# Patient Record
Sex: FEMALE | Race: WHITE | NOT HISPANIC OR LATINO | Employment: OTHER | URBAN - METROPOLITAN AREA
[De-identification: names, ages, dates, MRNs, and addresses within clinical notes are randomized per-mention and may not be internally consistent; named-entity substitution may affect disease eponyms.]

---

## 2023-01-22 ENCOUNTER — OFFICE VISIT (OUTPATIENT)
Dept: URGENT CARE | Facility: URGENT CARE | Age: 58
End: 2023-01-22
Payer: COMMERCIAL

## 2023-01-22 VITALS
OXYGEN SATURATION: 99 % | SYSTOLIC BLOOD PRESSURE: 101 MMHG | RESPIRATION RATE: 14 BRPM | BODY MASS INDEX: 24.13 KG/M2 | WEIGHT: 145 LBS | HEART RATE: 78 BPM | TEMPERATURE: 97.7 F | DIASTOLIC BLOOD PRESSURE: 70 MMHG

## 2023-01-22 DIAGNOSIS — H10.9 BACTERIAL CONJUNCTIVITIS: Primary | ICD-10-CM

## 2023-01-22 PROCEDURE — 99203 OFFICE O/P NEW LOW 30 MIN: CPT

## 2023-01-22 RX ORDER — BUPROPION HYDROCHLORIDE 150 MG/1
1 TABLET ORAL DAILY
COMMUNITY
Start: 2022-08-18

## 2023-01-22 RX ORDER — BUPROPION HYDROCHLORIDE 300 MG/1
TABLET ORAL
COMMUNITY
Start: 2022-08-18

## 2023-01-22 RX ORDER — DEXTROAMPHETAMINE SACCHARATE, AMPHETAMINE ASPARTATE, DEXTROAMPHETAMINE SULFATE AND AMPHETAMINE SULFATE 5; 5; 5; 5 MG/1; MG/1; MG/1; MG/1
TABLET ORAL
COMMUNITY
Start: 2022-10-26

## 2023-01-22 RX ORDER — ERYTHROMYCIN 5 MG/G
0.5 OINTMENT OPHTHALMIC AT BEDTIME
Qty: 3.5 G | Refills: 0 | Status: SHIPPED | OUTPATIENT
Start: 2023-01-22

## 2023-01-22 NOTE — PROGRESS NOTES
URGENT CARE  Assessment & Plan   Assessment:   Irene Stewart is a 57 year old female who's clinical presentation today is consistent with:   1. Bacterial conjunctivitis  - erythromycin (ROMYCIN) 5 MG/GM ophthalmic ointment; Place 0.5 inches into both eyes At Bedtime  Dispense: 3.5 g; Refill: 0    No alarm signs or symptoms present   Differential Diagnoses for this patient's CC include    Conjunctivitis: bacterial vs viral vs allergic, foreign body,    Dry eye, blepharitis, corneal abrasion, corneal ulcer, keratitis,   Contact lens abuse, uvitis/iritis, acute angle glaucoma, stye     Plan:  Will treat patient's bacterial conjunctivitis with topical antibiotic therapy at this time. Discussed w/ patient that ointment is preferred as it functions as a lubricant and aids in healing, however it can cause a transient blurring of vision and this is to be expected.   Encouraged patient for pain they should use Ibuprofen and/or tylenol as needed. Additionally a cool, moist compresses over the affected eye, as needed may also help relieve the discomfort  Educated patient  on the infective/ contagious nature of this condition. Informed parent that child can return to work in 24 hrs after antibiotic treatment. Encouraged strict hand washing, avoiding touching the eye, not sharing towels or bedding to prevent spread of infection.  Additionally we discussed if symptoms do not improve after starting today's treatment (or if symptoms worsen) to follow up in 3-5 days.    Patient  is  agreeable to treatment plan and state they will follow-up if symptoms do not improve and/or if symptoms worsen (see patient's AVS 'monitor for' section for specific patient instructions given and discussed regarding what to watch for and when to follow up)    Medications ordered are listed above, please see AVS for patient's specific and personalized discharge instructions given     CATA Ward [facility]  [facility]      ______________________________________________________________________        Subjective  Subjective     HPI: Irene Stewart  is a 57 year old  female who presents today for evaluation the following concerns:   Patient presents for evaluation of right eye that they describe as having increased   redness, having purulent drainage, mattering of eyelashes, and increased tearing  Patient denies any swelling} and/or foreign body sensation}  Patient states symptoms started about  1 day ago on 1/21/23   Onset of symptoms reported as sudden and not happening after any incident or  Trauma.   Patient denies wearing contacts. Patient denies any other viral URI symptoms.   Patient denies any Increasing eye pain or worsening/changes in vision/ visual acuity/ blurry vision   patient denies any redness of the eyelids or surrounding soft tissue/skin  Patient denies any  photosensitively, pain with eye movement      Allergies   Allergen Reactions     Pcn [Penicillins]      There is no problem list on file for this patient.      Review of Systems:  Pertinent review of systems as reflected in HPI, otherwise negative.     Objective  Objective    Physical Exam:  Vitals:    01/22/23 1005   BP: 101/70   Pulse: 78   Resp: 14   Temp: 97.7  F (36.5  C)   SpO2: 99%   Weight: 65.8 kg (145 lb)      General: Alert and oriented, no acute distress, Vital signs reviewed: afebrile,  normotensive   Psy/mental status: Cooperative, nonanxious  SKIN: Intact, no rashes  EYES:   EOMs intact, PERRLA bilaterally   Conjunctiva: slight injection and erythema to right conjunctiva of eye   Drainage: Copious tearing to right eye and creamy/yellow drainage noted with mattering of lashes on the right} eye    No photophobia noted     No visual acuity changes noted, no blur vision noted   Slight soft tissue swelling noted at inner corner of skin next to patient's eye, in the area next to patient's nose (inner corner).     EARS: TMs intact, translucent  gray in color with normal landmarks present no erythema  or bulging tympanic membrane   Canals are without swelling, however have a mild amount of cerumen, no impaction  NOSE:  mucosa erythematous bilaterally with a mild amount of rhinorrhea, clear  discharge               No frontal or maxillary sinus tenderness present bilaterally  MOUTH/THROAT: lips, tongue, & oral mucosa appear normal upon inspection                Posterior oropharynx is erythematous but without exudate, lesions or tonsillar  Edema, no dysphonia   NECK: supple, has full range of motion with no meningeal signs              No lymphadenopathy present  LUNG: normal work of breathing, good respiratory effort without retractions, good air  movement, non labored, inspection reveals normal chest expansion w/  inspiration         I explained my diagnostic considerations and recommendations to the patient, who voiced understanding and agreement with the treatment plan.   All questions were answered.   We discussed potential side effects, risks and benefits of any prescribed or recommended therapies, as well as expectations for response to treatments.  Please see AVS for any patient instructions & handouts given.   Patient was advised to contact the Nurse Care Line, their Primary Care provider, Urgent Care, or the Emergency Department if there are new or worsening symptoms, or call 911 for emergencies.        ______________________________________________________________________          Patient Instructions   Diagnosis:  bacterial  conjunctivitis     Plan:     Antibiotic drop     Cool compress     Tylenol or ibuprofen     Antihistamines as needed - allergy      Contagious - avoid touching eye    Can return to school/work/dayare 24 hours after antibiotics are started     Discard any eye makeup    Wash bedding- pillow cases, towels, washcloths     Monitor for:     Eyelid swells more    Eye pain gets worse    Redness or drainage from the eye gets  worse    Blurry vision gets worse or you have increased sensitivity to light    Normal vision does not return within 24 to 48 hours         CONJUNCTIVITIS  - CAN BE VIRAL, BACTERIAL, OR ALLERGIC  The membrane that covers the white part of your eye (the conjunctiva) is inflamed.   Inflammation happens when your body responds to an injury, allergic reaction, infection, or illness.   Conjunctivitis can be caused by a bacteria (pink eye) or it can be viral from a cold   Symptoms of inflammation in the eye may include redness, irritation, itching, swelling, or burning.   These symptoms should go away within the next 24 hours.   Conjunctivitis may be related to a particle that was in your eye.   If so, it may wash out with your tears or irrigation treatment.

## 2023-01-22 NOTE — PATIENT INSTRUCTIONS
Diagnosis:  bacterial  conjunctivitis     Plan:   Antibiotic drop   Cool compress   Tylenol or ibuprofen   Antihistamines as needed - allergy    Contagious - avoid touching eye  Can return to school/work/dayare 24 hours after antibiotics are started   Discard any eye makeup  Wash bedding- pillow cases, towels, washcloths     Monitor for:   Eyelid swells more  Eye pain gets worse  Redness or drainage from the eye gets worse  Blurry vision gets worse or you have increased sensitivity to light  Normal vision does not return within 24 to 48 hours         CONJUNCTIVITIS  - CAN BE VIRAL, BACTERIAL, OR ALLERGIC  The membrane that covers the white part of your eye (the conjunctiva) is inflamed.   Inflammation happens when your body responds to an injury, allergic reaction, infection, or illness.   Conjunctivitis can be caused by a bacteria (pink eye) or it can be viral from a cold   Symptoms of inflammation in the eye may include redness, irritation, itching, swelling, or burning.   These symptoms should go away within the next 24 hours.   Conjunctivitis may be related to a particle that was in your eye.   If so, it may wash out with your tears or irrigation treatment.

## 2023-01-27 ENCOUNTER — E-VISIT (OUTPATIENT)
Dept: URGENT CARE | Facility: CLINIC | Age: 58
End: 2023-01-27
Payer: COMMERCIAL

## 2023-01-27 DIAGNOSIS — H57.11 EYE PAIN, RIGHT: ICD-10-CM

## 2023-01-27 DIAGNOSIS — H57.89 REDNESS OF EYE, RIGHT: ICD-10-CM

## 2023-01-27 DIAGNOSIS — H00.011 HORDEOLUM EXTERNUM OF RIGHT UPPER EYELID: Primary | ICD-10-CM

## 2023-01-27 PROCEDURE — 99421 OL DIG E/M SVC 5-10 MIN: CPT | Performed by: EMERGENCY MEDICINE

## 2023-01-27 RX ORDER — CEPHALEXIN 500 MG/1
500 CAPSULE ORAL 3 TIMES DAILY
Qty: 21 CAPSULE | Refills: 0 | Status: SHIPPED | OUTPATIENT
Start: 2023-01-27 | End: 2023-02-03

## 2023-01-27 NOTE — PATIENT INSTRUCTIONS
Use warm compresses for 15-20 minutes every 2 hrs. Start oral antibiotics. Come into urgent care in 2-3 days if no better...sooner if worse.  RENU Martinez MD

## 2023-04-04 ENCOUNTER — ANCILLARY ORDERS (OUTPATIENT)
Dept: MAMMOGRAPHY | Facility: CLINIC | Age: 58
End: 2023-04-04

## 2023-04-04 ENCOUNTER — ANCILLARY PROCEDURE (OUTPATIENT)
Dept: MAMMOGRAPHY | Facility: CLINIC | Age: 58
End: 2023-04-04
Payer: COMMERCIAL

## 2023-04-04 DIAGNOSIS — Z12.31 VISIT FOR SCREENING MAMMOGRAM: ICD-10-CM

## 2023-04-04 PROCEDURE — 77063 BREAST TOMOSYNTHESIS BI: CPT | Mod: TC | Performed by: RADIOLOGY

## 2023-04-04 PROCEDURE — 77067 SCR MAMMO BI INCL CAD: CPT | Mod: TC | Performed by: RADIOLOGY

## 2023-04-05 ENCOUNTER — ANCILLARY ORDERS (OUTPATIENT)
Dept: RADIOLOGY | Facility: CLINIC | Age: 58
End: 2023-04-05

## 2024-06-23 ENCOUNTER — HEALTH MAINTENANCE LETTER (OUTPATIENT)
Age: 59
End: 2024-06-23

## 2025-06-21 ENCOUNTER — HEALTH MAINTENANCE LETTER (OUTPATIENT)
Age: 60
End: 2025-06-21

## 2025-07-12 ENCOUNTER — HEALTH MAINTENANCE LETTER (OUTPATIENT)
Age: 60
End: 2025-07-12